# Patient Record
Sex: FEMALE | Race: BLACK OR AFRICAN AMERICAN
[De-identification: names, ages, dates, MRNs, and addresses within clinical notes are randomized per-mention and may not be internally consistent; named-entity substitution may affect disease eponyms.]

---

## 2017-05-30 ENCOUNTER — HOSPITAL ENCOUNTER (EMERGENCY)
Dept: HOSPITAL 26 - MED | Age: 57
Discharge: LEFT BEFORE DISCHARGE | End: 2017-05-30
Payer: SELF-PAY

## 2017-05-30 VITALS — SYSTOLIC BLOOD PRESSURE: 156 MMHG | DIASTOLIC BLOOD PRESSURE: 86 MMHG

## 2017-05-30 VITALS — HEIGHT: 64 IN | BODY MASS INDEX: 31.76 KG/M2 | WEIGHT: 186 LBS

## 2017-05-30 DIAGNOSIS — Z53.21: ICD-10-CM

## 2017-05-30 DIAGNOSIS — R51: Primary | ICD-10-CM

## 2017-06-12 ENCOUNTER — HOSPITAL ENCOUNTER (EMERGENCY)
Dept: HOSPITAL 26 - MED | Age: 57
Discharge: HOME | End: 2017-06-12
Payer: SELF-PAY

## 2017-06-12 VITALS — BODY MASS INDEX: 36.06 KG/M2 | HEIGHT: 61 IN | WEIGHT: 191 LBS

## 2017-06-12 VITALS — DIASTOLIC BLOOD PRESSURE: 84 MMHG | SYSTOLIC BLOOD PRESSURE: 158 MMHG
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
## 2017-06-12 DIAGNOSIS — S83.91XA: ICD-10-CM
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
## 2017-06-12 DIAGNOSIS — R07.89: ICD-10-CM

## 2017-06-12 DIAGNOSIS — S39.012A: Primary | ICD-10-CM

## 2017-06-12 PROCEDURE — 29505 APPLICATION LONG LEG SPLINT: CPT

## 2017-06-12 PROCEDURE — 72110 X-RAY EXAM L-2 SPINE 4/>VWS: CPT

## 2017-06-12 PROCEDURE — 96372 THER/PROPH/DIAG INJ SC/IM: CPT

## 2017-06-12 PROCEDURE — 99284 EMERGENCY DEPT VISIT MOD MDM: CPT

## 2017-06-12 PROCEDURE — 73562 X-RAY EXAM OF KNEE 3: CPT

## 2017-06-12 PROCEDURE — 93005 ELECTROCARDIOGRAM TRACING: CPT

## 2017-06-12 PROCEDURE — 71020: CPT
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
## 2017-06-12 NOTE — NUR
Patient discharged with v/s stable. Written and verbal after care instructions 
given and explained. 

Patient alert, oriented and verbalized understanding of instructions. 
Ambulatory with to car. All questions addressed prior to discharge. ID band 
removed. Patient advised to follow up with PMD. Rx of VALIUM 5MG, NAPROSYN 
500MG given. Patient educated on indication of medication including possible 
reaction and side effects. Opportunity to ask questions provided and answered.

## 2017-06-13 VITALS — SYSTOLIC BLOOD PRESSURE: 140 MMHG | DIASTOLIC BLOOD PRESSURE: 79 MMHG

## 2019-06-02 ENCOUNTER — HOSPITAL ENCOUNTER (EMERGENCY)
Dept: HOSPITAL 26 - MED | Age: 59
Discharge: HOME | End: 2019-06-02
Payer: SELF-PAY

## 2019-06-02 VITALS — SYSTOLIC BLOOD PRESSURE: 148 MMHG | DIASTOLIC BLOOD PRESSURE: 92 MMHG

## 2019-06-02 VITALS — DIASTOLIC BLOOD PRESSURE: 87 MMHG | SYSTOLIC BLOOD PRESSURE: 170 MMHG

## 2019-06-02 VITALS — HEIGHT: 66 IN | WEIGHT: 195.38 LBS | BODY MASS INDEX: 31.4 KG/M2

## 2019-06-02 DIAGNOSIS — Z88.8: ICD-10-CM

## 2019-06-02 DIAGNOSIS — M54.32: Primary | ICD-10-CM

## 2019-06-02 DIAGNOSIS — M54.31: ICD-10-CM

## 2019-06-02 PROCEDURE — 99284 EMERGENCY DEPT VISIT MOD MDM: CPT

## 2019-06-02 PROCEDURE — 96372 THER/PROPH/DIAG INJ SC/IM: CPT

## 2019-06-02 PROCEDURE — 72131 CT LUMBAR SPINE W/O DYE: CPT

## 2019-06-02 NOTE — NUR
Patient discharged with v/s stable. Written and verbal after care instructions 
given and explained. 

Patient alert, oriented and verbalized understanding of instructions. 
Ambulatory with steady gait. All questions addressed prior to discharge. ID 
band removed. Patient advised to follow up with PMD. Rx of TRAMADOL 50MG, 
ROBAXIN 500MG, MOTRIN 800MG ANS GABAPENTIN 300MG given. Patient educated on 
indication of medication including possible reaction and side effects. 
Opportunity to ask questions provided and answered.

## 2019-06-02 NOTE — NUR
C/O HIP PAIN X4 DAYS. PT REPORTS CONSTANT SHARP BL HIP PAIN THAT RADIATES  DOWN 
TO LEGS. PT DENIES TRAUMA. NO EDEMA, ERYTHEMA, BRUISING, OR DEFORMITY PRESENT. 
+CMS. 



MEDHX:DENIES

RX:DENIES